# Patient Record
Sex: MALE | Race: WHITE
[De-identification: names, ages, dates, MRNs, and addresses within clinical notes are randomized per-mention and may not be internally consistent; named-entity substitution may affect disease eponyms.]

---

## 2020-02-18 ENCOUNTER — HOSPITAL ENCOUNTER (OUTPATIENT)
Dept: HOSPITAL 92 - SCSMRI | Age: 65
Discharge: HOME | End: 2020-02-18
Attending: ORTHOPAEDIC SURGERY
Payer: MEDICARE

## 2020-02-18 DIAGNOSIS — M23.91: Primary | ICD-10-CM

## 2020-02-18 DIAGNOSIS — M94.8X6: ICD-10-CM

## 2020-02-21 NOTE — MRI
MRI RIGHT KNEE WITHOUT CONTRAST:

2/21/20

 

HISTORY: 

M23.91, internal derangement of right knee. Evaluate for meniscal tear. 

 

COMPARISON: 

None.

 

FINDINGS: 

 

MEDIAL MENISCUS:

There is a very faint peripheral most vertical osteomeatal tear of the posterior horn and body juncti
on medial meniscus with low grade adjacent capsular edema. There is also a low grade radial tear of t
he posterior horn medial meniscus 9 mm from the root involving the free edge and portion of the inter
mediate zone. 

 

LATERAL MENISCUS:

Intact.  

 

ACL has mild intraligamentous degeneration with ganglion pseudocyst. The posterior cruciate ligament 
is intact as well as the medial and lateral collateral ligaments.  

 

EXTENSOR MECHANISM:

Quadriceps tendon, patella and patellar tendon are all intact.

 

CARTILAGE:

 

Patellofemoral compartment:

There is a single full thickness cartilage fissure of the medial trochlea with early subcortical reac
tive marrow edema. A few cartilage fissures of 50 to 75% thickness of the central trochlea.  

 

Medial compartment:

Minimal chondral dehydration. No full thickness defect. 

 

Lateral compartment:

There is 50 to 75% chondral fraying throughout the central weightbearing surface lateral femoral cond
yle without full thickness detect. No significant delamination. 

 

MUSCLES:

The muscle signal and bulk is normal. 

 

SOFT TISSUES:

No significant joint effusion. Trace semimembranosus bursa effusion. 

 

IMPRESSION: 

1.      Fairly low grade peripheral vertical tear to the posterior horn and body junction medial meni
scus at the capsule with mild pericapsular edema. 

2.      Faint radial tear involving the free edge of the medial meniscal body, 9 mm from the footprin
t. 

3.      No gutter extrusion of the medial meniscus. 

4.      A single full thickness cartilage fissure of the medial patellar facet with early subcortical
 reactive marrow edema axial image 20. 

5.      50 to 75% chondral fraying throughout the central weightbearing surface lateral to the platea
u without a full thickness chondral defect, delamination, or subcortical reactive marrow changes. 

6.      A few 75% chondral fissures of the central trochlea. 

 

POS: CET

## 2020-05-29 ENCOUNTER — HOSPITAL ENCOUNTER (OUTPATIENT)
Dept: HOSPITAL 92 - LABBT | Age: 65
Discharge: HOME | End: 2020-05-29
Attending: ORTHOPAEDIC SURGERY
Payer: MEDICARE

## 2020-05-29 DIAGNOSIS — M23.91: ICD-10-CM

## 2020-05-29 DIAGNOSIS — Z11.59: ICD-10-CM

## 2020-05-29 DIAGNOSIS — Z01.818: Primary | ICD-10-CM

## 2020-05-29 LAB
ANION GAP SERPL CALC-SCNC: 11 MMOL/L (ref 10–20)
BASOPHILS # BLD AUTO: 0.1 THOU/UL (ref 0–0.2)
BASOPHILS NFR BLD AUTO: 0.9 % (ref 0–1)
BUN SERPL-MCNC: 12 MG/DL (ref 8.4–25.7)
CALCIUM SERPL-MCNC: 9.2 MG/DL (ref 7.8–10.44)
CHLORIDE SERPL-SCNC: 108 MMOL/L (ref 98–107)
CO2 SERPL-SCNC: 25 MMOL/L (ref 23–31)
CREAT CL PREDICTED SERPL C-G-VRATE: 0 ML/MIN (ref 70–130)
EOSINOPHIL # BLD AUTO: 0.1 THOU/UL (ref 0–0.7)
EOSINOPHIL NFR BLD AUTO: 1.3 % (ref 0–10)
GLUCOSE SERPL-MCNC: 89 MG/DL (ref 80–115)
HGB BLD-MCNC: 15 G/DL (ref 14–18)
LYMPHOCYTES # BLD: 3.1 THOU/UL (ref 1.2–3.4)
LYMPHOCYTES NFR BLD AUTO: 38.6 % (ref 21–51)
MCH RBC QN AUTO: 31.5 PG (ref 27–31)
MCV RBC AUTO: 92.2 FL (ref 78–98)
MONOCYTES # BLD AUTO: 0.6 THOU/UL (ref 0.11–0.59)
MONOCYTES NFR BLD AUTO: 7.4 % (ref 0–10)
NEUTROPHILS # BLD AUTO: 4.2 THOU/UL (ref 1.4–6.5)
NEUTROPHILS NFR BLD AUTO: 51.7 % (ref 42–75)
PLATELET # BLD AUTO: 201 THOU/UL (ref 130–400)
POTASSIUM SERPL-SCNC: 4 MMOL/L (ref 3.5–5.1)
RBC # BLD AUTO: 4.76 MILL/UL (ref 4.7–6.1)
SODIUM SERPL-SCNC: 140 MMOL/L (ref 136–145)
WBC # BLD AUTO: 8.1 THOU/UL (ref 4.8–10.8)

## 2020-05-29 PROCEDURE — 80048 BASIC METABOLIC PNL TOTAL CA: CPT

## 2020-05-29 PROCEDURE — U0003 INFECTIOUS AGENT DETECTION BY NUCLEIC ACID (DNA OR RNA); SEVERE ACUTE RESPIRATORY SYNDROME CORONAVIRUS 2 (SARS-COV-2) (CORONAVIRUS DISEASE [COVID-19]), AMPLIFIED PROBE TECHNIQUE, MAKING USE OF HIGH THROUGHPUT TECHNOLOGIES AS DESCRIBED BY CMS-2020-01-R: HCPCS

## 2020-05-29 PROCEDURE — 93010 ELECTROCARDIOGRAM REPORT: CPT

## 2020-05-29 PROCEDURE — 87635 SARS-COV-2 COVID-19 AMP PRB: CPT

## 2020-05-29 PROCEDURE — 93005 ELECTROCARDIOGRAM TRACING: CPT

## 2020-05-29 PROCEDURE — 85025 COMPLETE CBC W/AUTO DIFF WBC: CPT

## 2020-06-03 ENCOUNTER — HOSPITAL ENCOUNTER (OUTPATIENT)
Dept: HOSPITAL 92 - SDC | Age: 65
Discharge: HOME | End: 2020-06-03
Attending: ORTHOPAEDIC SURGERY
Payer: MEDICARE

## 2020-06-03 VITALS — BODY MASS INDEX: 28.6 KG/M2

## 2020-06-03 DIAGNOSIS — M17.11: Primary | ICD-10-CM

## 2020-06-03 DIAGNOSIS — M19.90: ICD-10-CM

## 2020-06-03 DIAGNOSIS — E78.00: ICD-10-CM

## 2020-06-03 DIAGNOSIS — N18.3: ICD-10-CM

## 2020-06-03 DIAGNOSIS — I12.9: ICD-10-CM

## 2020-06-03 DIAGNOSIS — D63.1: ICD-10-CM

## 2020-06-03 DIAGNOSIS — M94.261: ICD-10-CM

## 2020-06-03 DIAGNOSIS — N40.0: ICD-10-CM

## 2020-06-03 DIAGNOSIS — E78.5: ICD-10-CM

## 2020-06-03 DIAGNOSIS — Z79.899: ICD-10-CM

## 2020-06-03 PROCEDURE — S0020 INJECTION, BUPIVICAINE HYDRO: HCPCS

## 2020-06-03 PROCEDURE — 0SBC4ZZ EXCISION OF RIGHT KNEE JOINT, PERCUTANEOUS ENDOSCOPIC APPROACH: ICD-10-PCS | Performed by: ORTHOPAEDIC SURGERY

## 2020-06-03 NOTE — HP
HISTORY OF PRESENT ILLNESS:  The patient is a 64-year-old male with a several year

history of progressive pain to the right knee, which has become worse especially

recently without specific injury.  He has pain with activities including kneeling.

He only had partial relief with use of NSAIDs and previous cortisone injection,

which only gave temporary relief. He is not having pain with day-to-day activities. 



PAST MEDICAL HISTORY:  The patient has history of hyperlipidemia, anemia,

hypertension, chronic kidney disease, BPH. 



CURRENT MEDICATIONS:  Include:

1. Meloxicam.

2. Zyrtec.

3. Metoprolol.

4. Omeprazole.

5. Potassium.



ALLERGIES:  HE IS ALLERGIC TO LIPITOR.



FAMILY HISTORY:  Otherwise unremarkable.



SOCIAL HISTORY:  Otherwise unremarkable.



REVIEW OF SYSTEMS:  Otherwise unremarkable.



PHYSICAL EXAMINATION:

GENERAL:  Reveals a healthy male. 

HEENT:  Unremarkable. 

NECK:  Supple. 

CHEST:  Clear. 

HEART:  Regular rhythm. 

ABDOMEN:  Soft, nontender. 

RECTAL:  Deferred. 

GENITAL: Deferred. 

EXTREMITIES:  Pertinent findings related to the right knee.  There is no effusion.

There is normal alignment.  There is tenderness over the medial joint line.  Range

of motion is 0 to 135 degrees.  There is no instability.  Has palpable distal

pulses. 



DIAGNOSTIC STUDIES:  X-rays of his right knee reveal mild degenerative changes with

good joint space remaining.  MRI scan of the right knee reveals a possible

peripheral tear of the posterior horn of the medial meniscus and a radial tear

involving the meniscal body.  There are mild degenerative changes. 



IMPRESSION:  Internal derangement of right knee.  Possible medial meniscal tear,

possible component of degenerative joint disease. 



PLAN:  Arthroscopy, right knee with partial medial meniscectomy and/or debridement

and shaving.  The nature of the surgery, length of recovery, potential complications

such as infection, loss of motion, incomplete relief, thromboembolic phenomena,

neurovascular injury, progression of DJD, recurrent tear, need for additional

treatment, repeat surgery have been discussed in detail. 







Job ID:  [id number]

## 2020-06-03 NOTE — OP
DATE OF PROCEDURE:  06/03/2020



ANESTHESIA:  General.



PREOPERATIVE DIAGNOSIS:  Internal derangement, right knee.



POSTOPERATIVE DIAGNOSES:  Degenerative arthritis and chondromalacia of medial

femoral condyle and lateral tibial plateau, right knee. 



PROCEDURES PERFORMED:  Arthroscopy with debridement and shaving/chondroplasty of

medial femoral condyle and lateral tibial plateau. 



OPERATIVE FINDINGS:  Examination under anesthesia revealed the knee to be stable.

At arthroscopy, there is mild chondromalacia of patella, grade 1 to 2, but no areas

needing shaving.  Examination of the medial compartment revealed the medial meniscus

to be intact and not demonstrate a tear.  It was noted that the MRI scan showed a

possible peripheral tear.  There may have been some slight degeneration along the

free margin of the meniscus, but I did not see any distinct tear.  There was an

approximately 1 x 2 cm area of chondromalacia and chondral defect along the lateral

aspect of the weightbearing surface of the medial femoral condyle, which it was not

down to bone.  There was a flap of articular surface.  ACL was intact.  Lateral

meniscus revealed some fraying of the free margin, but the bulk of meniscus intact.

There was a grade 2 chondromalacia and early grade 3 chondromalacia of the lateral

tibial plateau, but no areas of exposed bone. 



DESCRIPTION OF PROCEDURE:  After satisfactory anesthesia was induced in supine

position, the patient was placed in a leg [place] and then prepped and draped in

routine manner.  The right leg was elevated and exsanguinated with an [employer]

bandage and the tourniquet inflated to 250 mmHg.  [place] arthroscope was introduced

through anterolateral portal, probed through anteromedial portal.  Inflow and

outflow accomplished through the scope using a [employer] arthroscopy pump.

Arthroscopy was carried out.  The above findings were noted.  All findings were

documented with video printer.  The chondral defect of the medial femoral condyle

was debrided with a motorized shaver and all loose fronds of articular surface were

smoothed and the defect contoured and probed and found to be stable.  Some of the

loose fronds of the lateral tibial plateau was similarly debrided with a motorized

shaver.  The scope was then introduced into the anterior medial portal and all

compartments visualized.  No additional pathology found.  The knee was then

copiously irrigated through the scope and all instruments were withdrawn.  30 mL of

0.5% plain Marcaine and 20 mL of 1% lidocaine with epinephrine were instilled into

the knee joint and then an additional 10 mL of this mixture injected at each portal

site.  The portal sites were closed with 3-0 nylon.  A sterile bulky compressive

dressing was applied and the tourniquet deflated after 21 minutes.  The foot

promptly pinked up.  The patient was awakened and taken to the recovery room in

stable condition.  There were no apparent intraoperative complications.  The

estimated blood loss was negligible. 



The patient will be discharged home in satisfactory condition, instructed on ice and

elevation, use of crutches, home exercise program by Physical Therapy Department.

He was given written wound care instructions and he has Norco 7.5 at home for pain.

He will be rechecked in my office in 10 days to 2 weeks or sooner if there are any

problems prior to that time. 







Job ID:  [id number]

## 2022-07-11 ENCOUNTER — HOSPITAL ENCOUNTER (OUTPATIENT)
Dept: HOSPITAL 92 - CSHLAB | Age: 67
Discharge: HOME | End: 2022-07-11
Attending: INTERNAL MEDICINE
Payer: MEDICARE

## 2022-07-11 DIAGNOSIS — Z20.822: Primary | ICD-10-CM

## 2022-07-11 DIAGNOSIS — K63.5: ICD-10-CM

## 2022-07-11 PROCEDURE — 87811 SARS-COV-2 COVID19 W/OPTIC: CPT

## 2022-07-14 ENCOUNTER — HOSPITAL ENCOUNTER (OUTPATIENT)
Dept: HOSPITAL 92 - CSHSDC | Age: 67
Discharge: HOME | End: 2022-07-14
Attending: INTERNAL MEDICINE
Payer: MEDICARE

## 2022-07-14 VITALS — BODY MASS INDEX: 28.3 KG/M2

## 2022-07-14 DIAGNOSIS — G43.909: ICD-10-CM

## 2022-07-14 DIAGNOSIS — K64.9: ICD-10-CM

## 2022-07-14 DIAGNOSIS — K57.30: ICD-10-CM

## 2022-07-14 DIAGNOSIS — Z12.11: Primary | ICD-10-CM

## 2022-07-14 DIAGNOSIS — Z80.0: ICD-10-CM

## 2022-07-14 DIAGNOSIS — D12.2: ICD-10-CM

## 2022-07-14 DIAGNOSIS — Z79.899: ICD-10-CM

## 2022-07-14 DIAGNOSIS — D12.4: ICD-10-CM

## 2022-07-14 DIAGNOSIS — Z86.010: ICD-10-CM

## 2022-07-14 DIAGNOSIS — K63.5: ICD-10-CM

## 2022-07-14 DIAGNOSIS — E78.5: ICD-10-CM

## 2022-07-14 DIAGNOSIS — K21.9: ICD-10-CM

## 2022-07-14 DIAGNOSIS — I10: ICD-10-CM

## 2022-07-14 DIAGNOSIS — Z20.822: ICD-10-CM

## 2022-07-14 DIAGNOSIS — N40.0: ICD-10-CM

## 2022-07-14 PROCEDURE — 0DBN8ZZ EXCISION OF SIGMOID COLON, VIA NATURAL OR ARTIFICIAL OPENING ENDOSCOPIC: ICD-10-PCS | Performed by: INTERNAL MEDICINE

## 2022-07-14 PROCEDURE — 0DBM8ZZ EXCISION OF DESCENDING COLON, VIA NATURAL OR ARTIFICIAL OPENING ENDOSCOPIC: ICD-10-PCS | Performed by: INTERNAL MEDICINE

## 2022-07-14 PROCEDURE — 0DBK8ZZ EXCISION OF ASCENDING COLON, VIA NATURAL OR ARTIFICIAL OPENING ENDOSCOPIC: ICD-10-PCS | Performed by: INTERNAL MEDICINE

## 2022-07-14 PROCEDURE — 88305 TISSUE EXAM BY PATHOLOGIST: CPT

## 2022-10-25 ENCOUNTER — HOSPITAL ENCOUNTER (OUTPATIENT)
Dept: HOSPITAL 92 - CSHCT | Age: 67
Discharge: HOME | End: 2022-10-25
Attending: UROLOGY
Payer: MEDICARE

## 2022-10-25 DIAGNOSIS — K57.30: ICD-10-CM

## 2022-10-25 DIAGNOSIS — N26.1: ICD-10-CM

## 2022-10-25 DIAGNOSIS — C61: Primary | ICD-10-CM

## 2022-10-25 DIAGNOSIS — K31.4: ICD-10-CM

## 2022-10-25 LAB — EGFRCR SERPLBLD CKD-EPI 2021: 55 ML/MIN/{1.73_M2}

## 2022-10-25 PROCEDURE — 74178 CT ABD&PLV WO CNTR FLWD CNTR: CPT

## 2022-10-25 PROCEDURE — 82565 ASSAY OF CREATININE: CPT

## 2023-01-03 ENCOUNTER — HOSPITAL ENCOUNTER (OUTPATIENT)
Dept: HOSPITAL 92 - LABBT | Age: 68
Discharge: HOME | End: 2023-01-03
Attending: SURGERY
Payer: MEDICARE

## 2023-01-03 DIAGNOSIS — I12.9: ICD-10-CM

## 2023-01-03 DIAGNOSIS — K42.9: ICD-10-CM

## 2023-01-03 DIAGNOSIS — R97.20: ICD-10-CM

## 2023-01-03 DIAGNOSIS — R35.0: ICD-10-CM

## 2023-01-03 DIAGNOSIS — C61: ICD-10-CM

## 2023-01-03 DIAGNOSIS — R81: ICD-10-CM

## 2023-01-03 DIAGNOSIS — Z01.818: Primary | ICD-10-CM

## 2023-01-03 DIAGNOSIS — R73.9: ICD-10-CM

## 2023-01-03 DIAGNOSIS — N40.1: ICD-10-CM

## 2023-01-03 DIAGNOSIS — N52.01: ICD-10-CM

## 2023-01-03 DIAGNOSIS — N18.31: ICD-10-CM

## 2023-01-03 LAB
ALBUMIN SERPL BCG-MCNC: 4.5 G/DL (ref 3.4–4.8)
ALP SERPL-CCNC: 71 U/L (ref 40–110)
ALT SERPL W P-5'-P-CCNC: 21 U/L (ref 8–55)
ANION GAP SERPL CALC-SCNC: 14 MMOL/L (ref 10–20)
APTT PPP: 26.5 SEC (ref 22–33)
AST SERPL-CCNC: 22 U/L (ref 5–34)
BILIRUB SERPL-MCNC: 0.5 MG/DL (ref 0.2–1.2)
BUN SERPL-MCNC: 18 MG/DL (ref 8.4–25.7)
CALCIUM SERPL-MCNC: 9.7 MG/DL (ref 7.8–10.44)
CHLORIDE SERPL-SCNC: 105 MMOL/L (ref 98–107)
CO2 SERPL-SCNC: 23 MMOL/L (ref 23–31)
CREAT CL PREDICTED SERPL C-G-VRATE: 0 ML/MIN (ref 70–130)
GLOBULIN SER CALC-MCNC: 2.8 G/DL (ref 2.4–3.5)
GLUCOSE SERPL-MCNC: 170 MG/DL (ref 80–115)
GLUCOSE UR STRIP-MCNC: 100 MG/DL
HGB BLD-MCNC: 14.2 G/DL (ref 13.5–17.5)
INR PPP: 0.9
MCH RBC QN AUTO: 30.4 PG (ref 27–33)
MCV RBC AUTO: 87.4 FL (ref 81.2–95.1)
PLATELET # BLD AUTO: 252 10X3/UL (ref 150–450)
POTASSIUM SERPL-SCNC: 3.8 MMOL/L (ref 3.5–5.1)
PROTHROMBIN TIME: 10.3 SEC (ref 9.5–12.1)
RBC # BLD AUTO: 4.67 10X6/UL (ref 4.32–5.72)
SODIUM SERPL-SCNC: 138 MMOL/L (ref 136–145)
SP GR UR STRIP: 1 (ref 1–1.03)
WBC # BLD AUTO: 6.6 10X3/UL (ref 3.5–10.5)

## 2023-01-03 PROCEDURE — 85610 PROTHROMBIN TIME: CPT

## 2023-01-03 PROCEDURE — 85730 THROMBOPLASTIN TIME PARTIAL: CPT

## 2023-01-03 PROCEDURE — 85027 COMPLETE CBC AUTOMATED: CPT

## 2023-01-03 PROCEDURE — 80053 COMPREHEN METABOLIC PANEL: CPT

## 2023-01-03 PROCEDURE — 87086 URINE CULTURE/COLONY COUNT: CPT

## 2023-01-03 PROCEDURE — 71046 X-RAY EXAM CHEST 2 VIEWS: CPT

## 2023-01-03 PROCEDURE — 81001 URINALYSIS AUTO W/SCOPE: CPT

## 2023-01-16 ENCOUNTER — HOSPITAL ENCOUNTER (INPATIENT)
Dept: HOSPITAL 92 - SDC | Age: 68
LOS: 4 days | Discharge: HOME | DRG: 707 | End: 2023-01-20
Attending: UROLOGY | Admitting: UROLOGY
Payer: MEDICARE

## 2023-01-16 VITALS — BODY MASS INDEX: 31.1 KG/M2

## 2023-01-16 DIAGNOSIS — Z20.822: ICD-10-CM

## 2023-01-16 DIAGNOSIS — C61: Primary | ICD-10-CM

## 2023-01-16 DIAGNOSIS — K56.7: ICD-10-CM

## 2023-01-16 LAB
ANION GAP SERPL CALC-SCNC: 14 MMOL/L (ref 10–20)
BASOPHILS # BLD AUTO: 0 THOU/UL (ref 0–0.2)
BASOPHILS NFR BLD AUTO: 0.4 % (ref 0–1)
BUN SERPL-MCNC: 19 MG/DL (ref 8.4–25.7)
CALCIUM SERPL-MCNC: 8.4 MG/DL (ref 7.8–10.44)
CAUTI INDICATIONS FOR CULTURE: (no result)
CHLORIDE SERPL-SCNC: 109 MMOL/L (ref 98–107)
CO2 SERPL-SCNC: 19 MMOL/L (ref 23–31)
CREAT CL PREDICTED SERPL C-G-VRATE: 67 ML/MIN (ref 70–130)
EOSINOPHIL # BLD AUTO: 0 THOU/UL (ref 0–0.7)
EOSINOPHIL NFR BLD AUTO: 0.1 % (ref 0–10)
GLUCOSE SERPL-MCNC: 173 MG/DL (ref 80–115)
HGB BLD-MCNC: 12.7 G/DL (ref 14–18)
LYMPHOCYTES # BLD: 0.8 THOU/UL (ref 1.2–3.4)
LYMPHOCYTES NFR BLD AUTO: 7.5 % (ref 21–51)
MCH RBC QN AUTO: 31.7 PG (ref 27–31)
MCV RBC AUTO: 90.2 FL (ref 78–98)
MONOCYTES # BLD AUTO: 0.2 THOU/UL (ref 0.11–0.59)
MONOCYTES NFR BLD AUTO: 2.1 % (ref 0–10)
NEUTROPHILS # BLD AUTO: 9.2 THOU/UL (ref 1.4–6.5)
NEUTROPHILS NFR BLD AUTO: 89.8 % (ref 42–75)
PLATELET # BLD AUTO: 180 10X3/UL (ref 130–400)
POTASSIUM SERPL-SCNC: 4.1 MMOL/L (ref 3.5–5.1)
PROT UR STRIP.AUTO-MCNC: 10 MG/DL
RBC # BLD AUTO: 3.99 MILL/UL (ref 4.7–6.1)
RBC UR QL AUTO: (no result) HPF (ref 0–3)
SODIUM SERPL-SCNC: 138 MMOL/L (ref 136–145)
SP GR UR STRIP: 1.02 (ref 1–1.04)
SPERM UR QL AUTO: (no result) HPF
WBC # BLD AUTO: 10.3 10X3/UL (ref 4.8–10.8)
WBC UR QL AUTO: (no result) HPF (ref 0–3)

## 2023-01-16 PROCEDURE — 0VT04ZZ RESECTION OF PROSTATE, PERCUTANEOUS ENDOSCOPIC APPROACH: ICD-10-PCS | Performed by: UROLOGY

## 2023-01-16 PROCEDURE — 81001 URINALYSIS AUTO W/SCOPE: CPT

## 2023-01-16 PROCEDURE — U0002 COVID-19 LAB TEST NON-CDC: HCPCS

## 2023-01-16 PROCEDURE — 88309 TISSUE EXAM BY PATHOLOGIST: CPT

## 2023-01-16 PROCEDURE — C1776 JOINT DEVICE (IMPLANTABLE): HCPCS

## 2023-01-16 PROCEDURE — 85025 COMPLETE CBC W/AUTO DIFF WBC: CPT

## 2023-01-16 PROCEDURE — 80048 BASIC METABOLIC PNL TOTAL CA: CPT

## 2023-01-16 PROCEDURE — 86850 RBC ANTIBODY SCREEN: CPT

## 2023-01-16 PROCEDURE — 8E0W4CZ ROBOTIC ASSISTED PROCEDURE OF TRUNK REGION, PERCUTANEOUS ENDOSCOPIC APPROACH: ICD-10-PCS | Performed by: UROLOGY

## 2023-01-16 PROCEDURE — 82570 ASSAY OF URINE CREATININE: CPT

## 2023-01-16 PROCEDURE — 3E033XZ INTRODUCTION OF VASOPRESSOR INTO PERIPHERAL VEIN, PERCUTANEOUS APPROACH: ICD-10-PCS | Performed by: UROLOGY

## 2023-01-16 PROCEDURE — 0TJB8ZZ INSPECTION OF BLADDER, VIA NATURAL OR ARTIFICIAL OPENING ENDOSCOPIC: ICD-10-PCS | Performed by: UROLOGY

## 2023-01-16 PROCEDURE — 87086 URINE CULTURE/COLONY COUNT: CPT

## 2023-01-16 PROCEDURE — 86900 BLOOD TYPING SEROLOGIC ABO: CPT

## 2023-01-16 PROCEDURE — 36415 COLL VENOUS BLD VENIPUNCTURE: CPT

## 2023-01-16 PROCEDURE — C1713 ANCHOR/SCREW BN/BN,TIS/BN: HCPCS

## 2023-01-16 PROCEDURE — S0028 INJECTION, FAMOTIDINE, 20 MG: HCPCS

## 2023-01-16 PROCEDURE — 86901 BLOOD TYPING SEROLOGIC RH(D): CPT

## 2023-01-16 RX ADMIN — FAMOTIDINE SCH MG: 10 INJECTION, SOLUTION INTRAVENOUS at 20:59

## 2023-01-17 LAB
ANION GAP SERPL CALC-SCNC: 10 MMOL/L (ref 10–20)
BASOPHILS # BLD AUTO: 0 THOU/UL (ref 0–0.2)
BASOPHILS NFR BLD AUTO: 0.1 % (ref 0–1)
BUN SERPL-MCNC: 13 MG/DL (ref 8.4–25.7)
CALCIUM SERPL-MCNC: 8.2 MG/DL (ref 7.8–10.44)
CHLORIDE SERPL-SCNC: 112 MMOL/L (ref 98–107)
CO2 SERPL-SCNC: 21 MMOL/L (ref 23–31)
CREAT CL PREDICTED SERPL C-G-VRATE: 83 ML/MIN (ref 70–130)
EOSINOPHIL # BLD AUTO: 0 THOU/UL (ref 0–0.7)
EOSINOPHIL NFR BLD AUTO: 0.2 % (ref 0–10)
GLUCOSE SERPL-MCNC: 119 MG/DL (ref 80–115)
HGB BLD-MCNC: 11.3 G/DL (ref 14–18)
HGB BLD-MCNC: 11.4 G/DL (ref 14–18)
LYMPHOCYTES # BLD: 1.2 THOU/UL (ref 1.2–3.4)
LYMPHOCYTES NFR BLD AUTO: 13.6 % (ref 21–51)
MCH RBC QN AUTO: 30.9 PG (ref 27–31)
MCV RBC AUTO: 91.7 FL (ref 78–98)
MONOCYTES # BLD AUTO: 0.9 THOU/UL (ref 0.11–0.59)
MONOCYTES NFR BLD AUTO: 9.6 % (ref 0–10)
NEUTROPHILS # BLD AUTO: 6.8 THOU/UL (ref 1.4–6.5)
NEUTROPHILS NFR BLD AUTO: 76.5 % (ref 42–75)
PLATELET # BLD AUTO: 178 10X3/UL (ref 130–400)
POTASSIUM SERPL-SCNC: 4.2 MMOL/L (ref 3.5–5.1)
RBC # BLD AUTO: 3.64 MILL/UL (ref 4.7–6.1)
SODIUM SERPL-SCNC: 139 MMOL/L (ref 136–145)
WBC # BLD AUTO: 8.9 10X3/UL (ref 4.8–10.8)

## 2023-01-17 RX ADMIN — FAMOTIDINE SCH MG: 10 INJECTION, SOLUTION INTRAVENOUS at 21:34

## 2023-01-17 RX ADMIN — FAMOTIDINE SCH: 10 INJECTION, SOLUTION INTRAVENOUS at 11:40

## 2023-01-17 RX ADMIN — HYDROCODONE BITARTRATE AND ACETAMINOPHEN PRN TAB: 10; 325 TABLET ORAL at 05:04

## 2023-01-17 RX ADMIN — HYDROCODONE BITARTRATE AND ACETAMINOPHEN PRN TAB: 10; 325 TABLET ORAL at 16:42

## 2023-01-17 RX ADMIN — CEFTRIAXONE SCH MLS: 1 INJECTION, POWDER, FOR SOLUTION INTRAMUSCULAR; INTRAVENOUS at 05:04

## 2023-01-18 LAB
ANION GAP SERPL CALC-SCNC: 12 MMOL/L (ref 10–20)
BASOPHILS # BLD AUTO: 0 THOU/UL (ref 0–0.2)
BASOPHILS NFR BLD AUTO: 0.2 % (ref 0–1)
BUN SERPL-MCNC: 11 MG/DL (ref 8.4–25.7)
CALCIUM SERPL-MCNC: 8.9 MG/DL (ref 7.8–10.44)
CHLORIDE SERPL-SCNC: 108 MMOL/L (ref 98–107)
CO2 SERPL-SCNC: 21 MMOL/L (ref 23–31)
CREAT CL PREDICTED SERPL C-G-VRATE: 79 ML/MIN (ref 70–130)
EOSINOPHIL # BLD AUTO: 0.1 THOU/UL (ref 0–0.7)
EOSINOPHIL NFR BLD AUTO: 0.9 % (ref 0–10)
GLUCOSE SERPL-MCNC: 107 MG/DL (ref 80–115)
HGB BLD-MCNC: 12.3 G/DL (ref 14–18)
LYMPHOCYTES # BLD: 2.2 THOU/UL (ref 1.2–3.4)
LYMPHOCYTES NFR BLD AUTO: 22.9 % (ref 21–51)
MCH RBC QN AUTO: 31.3 PG (ref 27–31)
MCV RBC AUTO: 91.3 FL (ref 78–98)
MONOCYTES # BLD AUTO: 0.7 THOU/UL (ref 0.11–0.59)
MONOCYTES NFR BLD AUTO: 7.1 % (ref 0–10)
NEUTROPHILS # BLD AUTO: 6.6 THOU/UL (ref 1.4–6.5)
NEUTROPHILS NFR BLD AUTO: 68.9 % (ref 42–75)
PLATELET # BLD AUTO: 180 10X3/UL (ref 130–400)
POTASSIUM SERPL-SCNC: 3.8 MMOL/L (ref 3.5–5.1)
RBC # BLD AUTO: 3.93 MILL/UL (ref 4.7–6.1)
SODIUM SERPL-SCNC: 137 MMOL/L (ref 136–145)
WBC # BLD AUTO: 9.6 10X3/UL (ref 4.8–10.8)

## 2023-01-18 RX ADMIN — HYDROCODONE BITARTRATE AND ACETAMINOPHEN PRN TAB: 10; 325 TABLET ORAL at 09:11

## 2023-01-18 RX ADMIN — CEFTRIAXONE SCH MLS: 1 INJECTION, POWDER, FOR SOLUTION INTRAMUSCULAR; INTRAVENOUS at 06:04

## 2023-01-18 RX ADMIN — FAMOTIDINE SCH: 10 INJECTION, SOLUTION INTRAVENOUS at 13:28

## 2023-01-18 RX ADMIN — FAMOTIDINE SCH MG: 10 INJECTION, SOLUTION INTRAVENOUS at 20:55

## 2023-01-18 RX ADMIN — HYDROCODONE BITARTRATE AND ACETAMINOPHEN PRN TAB: 10; 325 TABLET ORAL at 18:12

## 2023-01-19 LAB
ANION GAP SERPL CALC-SCNC: 13 MMOL/L (ref 10–20)
BASOPHILS # BLD AUTO: 0 THOU/UL (ref 0–0.2)
BASOPHILS NFR BLD AUTO: 0.1 % (ref 0–1)
BUN SERPL-MCNC: 12 MG/DL (ref 8.4–25.7)
CALCIUM SERPL-MCNC: 8.9 MG/DL (ref 7.8–10.44)
CHLORIDE SERPL-SCNC: 107 MMOL/L (ref 98–107)
CO2 SERPL-SCNC: 21 MMOL/L (ref 23–31)
CREAT CL PREDICTED SERPL C-G-VRATE: 83 ML/MIN (ref 70–130)
EOSINOPHIL # BLD AUTO: 0.1 THOU/UL (ref 0–0.7)
EOSINOPHIL NFR BLD AUTO: 1.9 % (ref 0–10)
GLUCOSE SERPL-MCNC: 90 MG/DL (ref 80–115)
HGB BLD-MCNC: 12.1 G/DL (ref 14–18)
LYMPHOCYTES # BLD: 2.3 THOU/UL (ref 1.2–3.4)
LYMPHOCYTES NFR BLD AUTO: 29.8 % (ref 21–51)
MCH RBC QN AUTO: 30.9 PG (ref 27–31)
MCV RBC AUTO: 91.3 FL (ref 78–98)
MONOCYTES # BLD AUTO: 0.6 THOU/UL (ref 0.11–0.59)
MONOCYTES NFR BLD AUTO: 8.1 % (ref 0–10)
NEUTROPHILS # BLD AUTO: 4.6 THOU/UL (ref 1.4–6.5)
NEUTROPHILS NFR BLD AUTO: 60.1 % (ref 42–75)
PLATELET # BLD AUTO: 159 10X3/UL (ref 130–400)
POTASSIUM SERPL-SCNC: 4 MMOL/L (ref 3.5–5.1)
RBC # BLD AUTO: 3.9 MILL/UL (ref 4.7–6.1)
SODIUM SERPL-SCNC: 137 MMOL/L (ref 136–145)
WBC # BLD AUTO: 7.6 10X3/UL (ref 4.8–10.8)

## 2023-01-19 RX ADMIN — FAMOTIDINE SCH MG: 10 INJECTION, SOLUTION INTRAVENOUS at 08:52

## 2023-01-19 RX ADMIN — CEFTRIAXONE SCH MLS: 1 INJECTION, POWDER, FOR SOLUTION INTRAMUSCULAR; INTRAVENOUS at 05:24

## 2023-01-19 RX ADMIN — FAMOTIDINE SCH MG: 10 INJECTION, SOLUTION INTRAVENOUS at 21:19

## 2023-01-20 VITALS — DIASTOLIC BLOOD PRESSURE: 81 MMHG | SYSTOLIC BLOOD PRESSURE: 167 MMHG | TEMPERATURE: 98.3 F

## 2023-01-20 LAB
ANION GAP SERPL CALC-SCNC: 13 MMOL/L (ref 10–20)
BASOPHILS # BLD AUTO: 0 THOU/UL (ref 0–0.2)
BASOPHILS NFR BLD AUTO: 0.5 % (ref 0–1)
BUN SERPL-MCNC: 12 MG/DL (ref 8.4–25.7)
CALCIUM SERPL-MCNC: 9.2 MG/DL (ref 7.8–10.44)
CHLORIDE SERPL-SCNC: 106 MMOL/L (ref 98–107)
CO2 SERPL-SCNC: 24 MMOL/L (ref 23–31)
CREAT CL PREDICTED SERPL C-G-VRATE: 76 ML/MIN (ref 70–130)
EOSINOPHIL # BLD AUTO: 0.2 THOU/UL (ref 0–0.7)
EOSINOPHIL NFR BLD AUTO: 3.1 % (ref 0–10)
GLUCOSE SERPL-MCNC: 100 MG/DL (ref 80–115)
HGB BLD-MCNC: 12.5 G/DL (ref 14–18)
LYMPHOCYTES # BLD: 2.5 THOU/UL (ref 1.2–3.4)
LYMPHOCYTES NFR BLD AUTO: 38.7 % (ref 21–51)
MCH RBC QN AUTO: 30.9 PG (ref 27–31)
MCV RBC AUTO: 90.3 FL (ref 78–98)
MONOCYTES # BLD AUTO: 0.5 THOU/UL (ref 0.11–0.59)
MONOCYTES NFR BLD AUTO: 8.5 % (ref 0–10)
NEUTROPHILS # BLD AUTO: 3.1 THOU/UL (ref 1.4–6.5)
NEUTROPHILS NFR BLD AUTO: 49.3 % (ref 42–75)
PLATELET # BLD AUTO: 185 10X3/UL (ref 130–400)
POTASSIUM SERPL-SCNC: 4.2 MMOL/L (ref 3.5–5.1)
RBC # BLD AUTO: 4.04 MILL/UL (ref 4.7–6.1)
SODIUM SERPL-SCNC: 139 MMOL/L (ref 136–145)
WBC # BLD AUTO: 6.4 10X3/UL (ref 4.8–10.8)

## 2023-01-20 RX ADMIN — FAMOTIDINE SCH: 10 INJECTION, SOLUTION INTRAVENOUS at 08:42

## 2023-01-30 ENCOUNTER — HOSPITAL ENCOUNTER (OUTPATIENT)
Dept: HOSPITAL 92 - CSHRAD | Age: 68
Discharge: HOME | End: 2023-01-30
Attending: UROLOGY
Payer: MEDICARE

## 2023-01-30 DIAGNOSIS — C61: Primary | ICD-10-CM

## 2023-01-30 PROCEDURE — 51600 INJECTION FOR BLADDER X-RAY: CPT

## 2023-01-30 PROCEDURE — 74430 CONTRAST X-RAY BLADDER: CPT
